# Patient Record
(demographics unavailable — no encounter records)

---

## 2025-04-10 NOTE — ASSESSMENT
[FreeTextEntry1] :  A/P: 38 year old female here for her initial medical weight loss appointment. PMHx is significant for class 3 obesity, possible PCOS, dyslipidemia, metabolic syndrome, hx of GDM, asthma, vestibular migraines, insulin resistance, metabolic syndrome, medication induced weight gain and risk factors for NISHI.    -Will work together with an interdisciplinary team to help achieve their weight loss goals. Will order a set of labs to better understand her metabolic state.  -Will connect with RD to help optimize nutrition and to increase protein as tolerated. Encouraged her to increase her water intake Insulin resistance/?PCOS/ Dyslipidemia/ Metabolic Syndrome - continue to monitor with weight loss Will try to get approval for Zepbound.  Will start with 2.5 mg sc once weekly x 4 weeks and then increase to 5 mg sc weekly thereafter. Discussed possible side effects, including constipation, worsening reflux, gastroparesis and low risk of hypoglycemia, pain at injection site. Also discussed possible inflammation of their pancreas, low risk, but can occur. No personal or family history of medullary thyroid cancer. Rx sent. While trying to get approval for a GLP-1, will bridge with phentermine 30 mg. Discussed possible side effects including, increased jitteriness/agitation, increased anxiety, increase in blood pressure and heart rate, and trouble sleeping. Rx sent today. Vestibular migraines - monitor with weight loss - did not tolerate topiramate Risk factors for NISHI- gave a referral to sleep medicine for sleep study and ordered home study. Pending result of sleep study - if Zepbound is not approved for obesity - will try for NISHI if mod to severe -Encouraged her to increase her physical activity as tolerated.  Due to the muscle wasting effect of GLP-1 Lindsey. Strongly encouraged that she start resistance/weight training to help maintain and mitigate the muscle wasting effect of this class of medication.   F/U with RD first available F/U with MD in ~6-8 weeks   Patient was seen by Dr. Walls on 4/10/25. Time spent with patient was greater than 65 minutes, including chart review, time spent with patient, and charting.

## 2025-04-10 NOTE — PHYSICAL EXAM
[No Rash or Lesion] : No rash or lesion [Alert] : alert [Oriented to Person] : oriented to person [Oriented to Place] : oriented to place [Oriented to Time] : oriented to time [Calm] : calm [de-identified] :  Well-appearing female in NAD [de-identified] :  CNs II-XII grossly intact [de-identified] :  No increased WOB

## 2025-04-10 NOTE — HISTORY OF PRESENT ILLNESS
[de-identified] : Referred by: mother   HPI: 38 year old female here for her initial medical weight loss appointment. PMHx is significant for class 3 obesity, possible PCOS, dyslipidemia, metabolic syndrome, hx of GDM, asthma, vestibular migraines, insulin resistance, metabolic syndrome, medication induced weight gain and risk factors for NISHI.    Weight History: Highest Weight: 340 lbs (Current - 2025) Lowest Weight: 160 lbs Current Weight: 340 lbs/54.46 kg/m2   Previous Weight Loss Efforts: Has struggled with her weight her whole life. Started on diets at 10.  Has tried WWs > 6 mos several times over the years starting in HS and more recently after the birth of her first child for over 1 yr. Lost ~30 lbs max each time that she tried it PCP has prescribed phentermine for weight loss several times over the past several years - tolerated well - Initially tried after the birth of her 2nd child - lost 40 lbs - lost from 240 rto 200s lbs. Was able to maintain weight from [id number] until the death of her father and COVID --> weight regain. More recently tried in  after the birth of her last child - was on it from  -March - lost 24 lbs In 2024 - PCP prescribe compounded semaglutide - was on it for ~3 months - experienced significant nausea, dizziness and HAs - did not continue due to SEs - lost ~10 lbs  For management of migraines - was trialed on topiramate - could not tolerate due to "making her feel off". Migraines improved with PT   Weight gain has gradually increased after the birth after each child -  weight gain was exacerbated by hyperemesis gravidum - was only able to tolerate carbs - weight gain was most significant after the birth of her 3rd child -  x 12 mos - appetite significantly increased during this time  Previous use of AOMs: semaglutide and phentermine as above in the HPI Medications that may have contributed to weight gain: zoloft  [place] be going to [facility] next week   Pertinent [facility] 2023: Tchol 169 HDL 46 L TGs 176 H FBG 92 hemoglobin A1c 5.3 Vit B12 324 Vit D 28 L   Review of Systems: Worsening hirsuitism Increasing central adiposity Fatigue +SOB with movement "Feels uncomfortable" normal BMs Occ GERD Migraines Numbness and tingling in B/L index fingers and thumbs - worse at night   Eating behaviors: Gigi = comfort Goes long periods without eating a sit down meal - grazes throughout the day and finishes left overs from her 3 children Rarely eats past the point of discomfort "Social eats"    24 Hr Recall: B: Coffee + 2 tsps of sugar + 1/2 & 1/2  vs nahed nut creamer 8:30 toast w/cream cheese vs avocado vs eggs 11:30 granola bar Sn: Yodoles + 2 cheese sticks, potato chips vs french fries D: pork loin +  donavon's fried rice Sn: 1/2 donut Kaylie: juice, gatorade vs gingerale, water   Movement: Minimal outside of household chores   SocHx: Lives with  and 3 children (2, 9, 12) 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

## 2025-04-10 NOTE — PHYSICAL EXAM
[No Rash or Lesion] : No rash or lesion [Alert] : alert [Oriented to Person] : oriented to person [Oriented to Place] : oriented to place [Oriented to Time] : oriented to time [Calm] : calm [de-identified] :  CNs II-XII grossly intact [de-identified] :  Well-appearing female in NAD [de-identified] :  No increased WOB

## 2025-04-10 NOTE — DATA REVIEWED
[FreeTextEntry1] : [facility] Nov 2023: Tchol 169 HDL 46 L TGs 176 H FBG 92 hemoglobin A1c 5.3 Vit B12 324 Vit D 28 L

## 2025-04-10 NOTE — HISTORY OF PRESENT ILLNESS
[de-identified] : Referred by: mother   HPI: 38 year old female here for her initial medical weight loss appointment. PMHx is significant for class 3 obesity, possible PCOS, dyslipidemia, metabolic syndrome, hx of GDM, asthma, vestibular migraines, insulin resistance, metabolic syndrome, medication induced weight gain and risk factors for NISHI.    Weight History: Highest Weight: 340 lbs (Current - 2025) Lowest Weight: 160 lbs Current Weight: 340 lbs/54.46 kg/m2   Previous Weight Loss Efforts: Has struggled with her weight her whole life. Started on diets at 10.  Has tried WWs > 6 mos several times over the years starting in HS and more recently after the birth of her first child for over 1 yr. Lost ~30 lbs max each time that she tried it PCP has prescribed phentermine for weight loss several times over the past several years - tolerated well - Initially tried after the birth of her 2nd child - lost 40 lbs - lost from 240 rto 200s lbs. Was able to maintain weight from [id number] until the death of her father and COVID --> weight regain. More recently tried in  after the birth of her last child - was on it from  -March - lost 24 lbs In 2024 - PCP prescribe compounded semaglutide - was on it for ~3 months - experienced significant nausea, dizziness and HAs - did not continue due to SEs - lost ~10 lbs  For management of migraines - was trialed on topiramate - could not tolerate due to "making her feel off". Migraines improved with PT   Weight gain has gradually increased after the birth after each child -  weight gain was exacerbated by hyperemesis gravidum - was only able to tolerate carbs - weight gain was most significant after the birth of her 3rd child -  x 12 mos - appetite significantly increased during this time  Previous use of AOMs: semaglutide and phentermine as above in the HPI Medications that may have contributed to weight gain: zoloft  Will be going to [facility] next week   Pertinent [facility] 2023: Tchol 169 HDL 46 L TGs 176 H FBG 92 hemoglobin A1c 5.3 Vit B12 324 Vit D 28 L   Review of Systems: Worsening hirsuitism Increasing central adiposity Fatigue +SOB with movement "Feels uncomfortable" normal BMs Occ GERD Migraines Numbness and tingling in B/L index fingers and thumbs - worse at night   Eating behaviors: Gigi = comfort Goes long periods without eating a sit down meal - grazes throughout the day and finishes left overs from her 3 children Rarely eats past the point of discomfort "Social eats"    24 Hr Recall: B: Coffee + 2 tsps of sugar + 1/2 & 1/2  vs nahed nut creamer 8:30 toast w/cream cheese vs avocado vs eggs 11:30 granola bar Sn: Yodoles + 2 cheese sticks, potato chips vs french fries D: pork loin +  donavon's fried rice Sn: 1/2 donut Kaylie: juice, gatorade vs gingerale, water   Movement: Minimal outside of household chores   SocHx: Lives with  and 3 children (2, 9, 12) 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

## 2025-06-22 NOTE — ASSESSMENT
[FreeTextEntry1] : A/P: 39 year old female here for her f/u medical weight loss appointment. PMHx is significant for class 3 obesity, possible PCOS, dyslipidemia, metabolic syndrome, hx of GDM, asthma, vestibular migraines, insulin resistance, metabolic syndrome, medication induced weight gain and risk factors for NISHI.  -Will work together with an interdisciplinary team to help achieve their weight loss goals. Was given lab requisition again to get labs prior to her next visit -Will connect with RD to help optimize nutrition and to increase protein as tolerated. Encouraged her to increase her water intake Insulin resistance/?PCOS/ Dyslipidemia/ Metabolic Syndrome - continue to monitor with weight loss. For management of possible PCOS, will try metformin  mg. Discussed possible side effects including gastrointestinal upset, especially diarrhea. Will start with one tab with breakfast for one week. Increase to one tab with breakfast and one tab with dinner the second week. Rx sent today. Encouraged her to start this after she has completed her bloodwork ordered by gyn Zepbound was denied by current insurance. Will be changing insurance in January - pending insurance - may try again.  Continue phentermine 30 mg.  Vestibular migraines - monitor with weight loss - did not tolerate topiramate Risk factors for NISHI- gave a referral to sleep medicine for sleep study and ordered home study. Pending result of sleep study - if Zepbound is not approved for obesity - will try for NISHI if mod to severe in the future once she gets her new insurance -Continue to increase her physical activity as tolerated. Due to the muscle wasting effect of GLP-1 Lindsey. Strongly encouraged that she start resistance/weight training to help maintain and mitigate the muscle wasting effect of this class of medication.  F/U with RD per protocol F/U with MD in ~6-8 weeks  Patient was seen by Dr. Walls on 6/13/25. Time spent with patient was greater than 30 minutes, including chart review, time spent with patient, and charting.

## 2025-06-22 NOTE — HISTORY OF PRESENT ILLNESS
[de-identified] : Referred by: mother   HPI: 39 year old female here for her f/u medical weight loss appointment. PMHx is significant for class 3 obesity, possible PCOS, dyslipidemia, metabolic syndrome, hx of GDM, asthma, vestibular migraines, insulin resistance, metabolic syndrome, medication induced weight gain and risk factors for NISHI.    Weight History: Highest Weight: 340 lbs (2025) Lowest Weight: 160 lbs  Weight at [facility]: 340 lbs/54.46 kg/m2 (2025)  Since we last met, she has started phentermine 30 mg. Is tolerating well - no palpitations - no trouble sleeping.   With 30 lb weight loss has gotten her period -LMP 25 - reports that her period was very heavy - also reports intermittent spotting since then Has met with gyn - is currently being worked up for PCOS - has an u/s pending  Has stopped drinking soda - is also making better choices  Reports that her fatigue has improved  Has also been working to incrementally increase her physical activity -   Was not been able to get her blood work done prior to this visit Has not yet been contacted by sleep medicine for her sleep study   Previous Weight Loss Efforts: Has struggled with her weight her whole life. Started on diets at 10.  Has tried WWs > 6 mos several times over the years starting in HS and more recently after the birth of her first child for over 1 yr. Lost ~30 lbs max each time that she tried it PCP has prescribed phentermine for weight loss several times over the past several years - tolerated well - Initially tried after the birth of her 2nd child - lost 40 lbs - lost from 240 rto 200s lbs. Was able to maintain weight from [id number] until the death of her father and COVID --> weight regain. More recently tried in  after the birth of her last child - was on it from  -March - lost 24 lbs In 2024 - PCP prescribe compounded semaglutide - was on it for ~3 months - experienced significant nausea, dizziness and HAs - did not continue due to SEs - lost ~10 lbs  For management of migraines - was trialed on topiramate - could not tolerate due to "making her feel off". Migraines improved with PT   Weight gain has gradually increased after the birth after each child -  weight gain was exacerbated by hyperemesis gravidum - was only able to tolerate carbs - weight gain was most significant after the birth of her 3rd child -  x 12 mos - appetite significantly increased during this time  Previous use of AOMs: semaglutide and phentermine as above in the HPI Medications that may have contributed to weight gain: zoloft   Pertinent [facility] 2023: Tchol 169 HDL 46 L TGs 176 H FBG 92 hemoglobin A1c 5.3 Vit B12 324 Vit D 28 L    Eating behaviors: Food = comfort Goes long periods without eating a sit down meal - grazes throughout the day and finishes left overs from her 3 children Rarely eats past the point of discomfort "Social eats"    Movement: Minimal outside of household chores   SocHx: Lives with  and 3 children (2, 9, 12) 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

## 2025-06-22 NOTE — PHYSICAL EXAM
[No Rash or Lesion] : No rash or lesion [Alert] : alert [Oriented to Person] : oriented to person [Oriented to Place] : oriented to place [Oriented to Time] : oriented to time [Calm] : calm [de-identified] :  Well-appearing female in NAD  [de-identified] :  CNs II-XII grossly intact  [de-identified] :  No increased WOB

## 2025-06-22 NOTE — HISTORY OF PRESENT ILLNESS
[de-identified] : Referred by: mother   HPI: 39 year old female here for her f/u medical weight loss appointment. PMHx is significant for class 3 obesity, possible PCOS, dyslipidemia, metabolic syndrome, hx of GDM, asthma, vestibular migraines, insulin resistance, metabolic syndrome, medication induced weight gain and risk factors for NISHI.    Weight History: Highest Weight: 340 lbs (2025) Lowest Weight: 160 lbs  Weight at [facility]: 340 lbs/54.46 kg/m2 (2025)  Since we last met, she has started phentermine 30 mg. Is tolerating well - no palpitations - no trouble sleeping.   With 30 lb weight loss has gotten her period -LMP 25 - reports that her period was very heavy - also reports intermittent spotting since then Has met with gyn - is currently being worked up for PCOS - has an u/s pending  Has stopped drinking soda - is also making better choices  Reports that her fatigue has improved  Has also been working to incrementally increase her physical activity -   Was not been able to get her blood work done prior to this visit Has not yet been contacted by sleep medicine for her sleep study   Previous Weight Loss Efforts: Has struggled with her weight her whole life. Started on diets at 10.  Has tried WWs > 6 mos several times over the years starting in HS and more recently after the birth of her first child for over 1 yr. Lost ~30 lbs max each time that she tried it PCP has prescribed phentermine for weight loss several times over the past several years - tolerated well - Initially tried after the birth of her 2nd child - lost 40 lbs - lost from 240 rto 200s lbs. Was able to maintain weight from [phone] until the death of her father and COVID --> weight regain. More recently tried in  after the birth of her last child - was on it from  -March - lost 24 lbs In 2024 - PCP prescribe compounded semaglutide - was on it for ~3 months - experienced significant nausea, dizziness and HAs - did not continue due to SEs - lost ~10 lbs  For management of migraines - was trialed on topiramate - could not tolerate due to "making her feel off". Migraines improved with PT   Weight gain has gradually increased after the birth after each child -  weight gain was exacerbated by hyperemesis gravidum - was only able to tolerate carbs - weight gain was most significant after the birth of her 3rd child -  x 12 mos - appetite significantly increased during this time  Previous use of AOMs: semaglutide and phentermine as above in the HPI Medications that may have contributed to weight gain: zoloft   Pertinent [facility] 2023: Tchol 169 HDL 46 L TGs 176 H FBG 92 hemoglobin A1c 5.3 Vit B12 324 Vit D 28 L    Eating behaviors: Food = comfort Goes long periods without eating a sit down meal - grazes throughout the day and finishes left overs from her 3 children Rarely eats past the point of discomfort "Social eats"    Movement: Minimal outside of household chores   SocHx: Lives with  and 3 children (2, 9, 12) 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

## 2025-06-22 NOTE — PHYSICAL EXAM
[No Rash or Lesion] : No rash or lesion [Alert] : alert [Oriented to Person] : oriented to person [Oriented to Place] : oriented to place [Oriented to Time] : oriented to time [Calm] : calm [de-identified] :  Well-appearing female in NAD  [de-identified] :  CNs II-XII grossly intact  [de-identified] :  No increased WOB